# Patient Record
Sex: FEMALE | URBAN - METROPOLITAN AREA
[De-identification: names, ages, dates, MRNs, and addresses within clinical notes are randomized per-mention and may not be internally consistent; named-entity substitution may affect disease eponyms.]

---

## 2023-10-17 ENCOUNTER — OFFICE VISIT (OUTPATIENT)
Dept: FAMILY MEDICINE CLINIC | Age: 28
End: 2023-10-17
Payer: COMMERCIAL

## 2023-10-17 VITALS
HEART RATE: 80 BPM | DIASTOLIC BLOOD PRESSURE: 58 MMHG | TEMPERATURE: 97.7 F | BODY MASS INDEX: 18.32 KG/M2 | WEIGHT: 128 LBS | SYSTOLIC BLOOD PRESSURE: 100 MMHG | OXYGEN SATURATION: 98 % | HEIGHT: 70 IN

## 2023-10-17 DIAGNOSIS — Z13.1 SCREENING FOR DIABETES MELLITUS: ICD-10-CM

## 2023-10-17 DIAGNOSIS — Z13.29 SCREENING FOR THYROID DISORDER: ICD-10-CM

## 2023-10-17 DIAGNOSIS — G43.709 CHRONIC MIGRAINE WITHOUT AURA WITHOUT STATUS MIGRAINOSUS, NOT INTRACTABLE: ICD-10-CM

## 2023-10-17 DIAGNOSIS — Z13.220 SCREENING FOR LIPID DISORDERS: ICD-10-CM

## 2023-10-17 DIAGNOSIS — Z00.00 PHYSICAL EXAM: Primary | ICD-10-CM

## 2023-10-17 DIAGNOSIS — Z13.0 SCREENING FOR DEFICIENCY ANEMIA: ICD-10-CM

## 2023-10-17 PROCEDURE — 99385 PREV VISIT NEW AGE 18-39: CPT | Performed by: STUDENT IN AN ORGANIZED HEALTH CARE EDUCATION/TRAINING PROGRAM

## 2023-10-17 RX ORDER — SUMATRIPTAN 50 MG/1
50 TABLET, FILM COATED ORAL
Qty: 9 TABLET | Refills: 5 | Status: SHIPPED | OUTPATIENT
Start: 2023-10-17 | End: 2023-10-17

## 2023-10-17 SDOH — ECONOMIC STABILITY: HOUSING INSECURITY
IN THE LAST 12 MONTHS, WAS THERE A TIME WHEN YOU DID NOT HAVE A STEADY PLACE TO SLEEP OR SLEPT IN A SHELTER (INCLUDING NOW)?: NO

## 2023-10-17 SDOH — ECONOMIC STABILITY: FOOD INSECURITY: WITHIN THE PAST 12 MONTHS, YOU WORRIED THAT YOUR FOOD WOULD RUN OUT BEFORE YOU GOT MONEY TO BUY MORE.: NEVER TRUE

## 2023-10-17 SDOH — ECONOMIC STABILITY: INCOME INSECURITY: HOW HARD IS IT FOR YOU TO PAY FOR THE VERY BASICS LIKE FOOD, HOUSING, MEDICAL CARE, AND HEATING?: NOT HARD AT ALL

## 2023-10-17 SDOH — ECONOMIC STABILITY: FOOD INSECURITY: WITHIN THE PAST 12 MONTHS, THE FOOD YOU BOUGHT JUST DIDN'T LAST AND YOU DIDN'T HAVE MONEY TO GET MORE.: NEVER TRUE

## 2023-10-17 ASSESSMENT — PATIENT HEALTH QUESTIONNAIRE - PHQ9
SUM OF ALL RESPONSES TO PHQ QUESTIONS 1-9: 0
SUM OF ALL RESPONSES TO PHQ QUESTIONS 1-9: 0
SUM OF ALL RESPONSES TO PHQ9 QUESTIONS 1 & 2: 0
SUM OF ALL RESPONSES TO PHQ QUESTIONS 1-9: 0
2. FEELING DOWN, DEPRESSED OR HOPELESS: 0
SUM OF ALL RESPONSES TO PHQ QUESTIONS 1-9: 0
1. LITTLE INTEREST OR PLEASURE IN DOING THINGS: 0

## 2023-10-17 NOTE — PROGRESS NOTES
[facility]  [facility]  [address] [place] [id number]     Date of Visit:  10/17/2023  Patient Name: Cam Dunn   Patient :  1995     CHIEF COMPLAINT:     Cam Dunn is a 33 y.o. female who presents today for an general visit to be evaluated for the following condition(s):  No chief complaint on file. REVIEW OF SYSTEM      Review of Systems   All other systems reviewed and are negative. HISTORY OF PRESENT ILLNESS     Patient is a 60-year-old female with a past medical history of migraines and endometriosis who presents to the office as a new patient for a physical exam.  Patient brings in paperwork to be completed so that she may adopt another child. She has 1 adopted child who is currently 3year-old. Migraines:  Patient states that she takes sumatriptan as needed for migraines however has not had to take it in the last few months. Patient would like refill of her medication. Diet:  Patient states that she does not eat fast food. She eats plenty of fruits and vegetables and cooks at home. She does not drink any soda. Exercise:  Patient presents about 2-3 times a week and gets 150 minutes of exercise. Sleep:  Patient states that she gets plenty of sleep. She can fall asleep easily and does not wake up in the middle the night unless her child wakes up. Stress:   Patient does not have any current depression or stress. PHQ2- 0. Patient recently lost her mother 1 month ago due to an infection. She also had frontotemporal dementia and was declining. .  Patient states that she has been handling the grief and has support. REVIEWED INFORMATION      No Known Allergies    Patient Active Problem List   Diagnosis    Physical exam    Chronic migraine without aura without status migrainosus, not intractable       History reviewed. No pertinent past medical history.     Past Surgical History:   Procedure Laterality Date    BREAST ENHANCEMENT SURGERY

## 2023-10-18 NOTE — ASSESSMENT & PLAN NOTE
Physical exam: Stable  Diet: Advised high fiber diet of fresh fruits, vegetables and nuts. Advised decreasing trans fats in diet including red meat and processed red meats, refined carbohydrates, and sweetened beverages. Exercise: Advised moderate intensity exercise (brisk walking) 150 minutes a week or at least 5,000-10,000 steps a day. Sleep: Advised good sleep habits including turning tv and phone off. 6-8 hours advised.      -paperwork completed for adoption physical

## 2023-11-16 PROBLEM — Z00.00 PHYSICAL EXAM: Status: RESOLVED | Noted: 2023-10-17 | Resolved: 2023-11-16
